# Patient Record
Sex: FEMALE | Race: WHITE | Employment: FULL TIME | URBAN - METROPOLITAN AREA
[De-identification: names, ages, dates, MRNs, and addresses within clinical notes are randomized per-mention and may not be internally consistent; named-entity substitution may affect disease eponyms.]

---

## 2023-01-19 ENCOUNTER — MEDICAL CORRESPONDENCE (OUTPATIENT)
Dept: HEALTH INFORMATION MANAGEMENT | Facility: CLINIC | Age: 33
End: 2023-01-19
Payer: COMMERCIAL

## 2023-01-24 ENCOUNTER — TRANSFERRED RECORDS (OUTPATIENT)
Dept: HEALTH INFORMATION MANAGEMENT | Facility: CLINIC | Age: 33
End: 2023-01-24
Payer: COMMERCIAL

## 2023-01-25 ENCOUNTER — TRANSCRIBE ORDERS (OUTPATIENT)
Dept: OTHER | Age: 33
End: 2023-01-25

## 2023-01-25 DIAGNOSIS — K70.10 CHRONIC ALCOHOLIC HEPATITIS (H): Primary | ICD-10-CM

## 2023-02-01 NOTE — TELEPHONE ENCOUNTER
DIAGNOSIS: Chronic alcoholic hepatitis   Appt Date: 03.17.2023   NOTES STATUS DETAILS   OFFICE NOTE from referring provider     OFFICE NOTES from other specialists Care Everywhere 01.24.2023 Catalina Matthew, CNP  [facility]   DISCHARGE SUMMARY from hospital     MEDICATION LIST Care Everywhere    LIVER BIOSPY (IF APPLICABLE)      PATHOLOGY REPORTS      IMAGING     ENDOSCOPY (IF AVAILABLE)     COLONOSCOPY (IF AVAILABLE)     ULTRASOUND LIVER Care Everywhere 01.23.2023, 01.13.2023, 01.06.2023 US Paracentesis    12.19.2022, 12.04.2022  US Abd Limited   CT OF ABDOMEN Care Everywhere 07.16.2020 CT Abd Pelvis   MRI OF LIVER     FIBROSCAN, US ELASTOGRAPHY, FIBROSIS SCAN, MR ELASTOGRAPHY     LABS     HEPATIC PANEL (LIVER PANEL) Care Everywhere 12.23.2022   BASIC METABOLIC PANEL Care Everywhere 01.31.2023   COMPLETE METABOLIC PANEL Care Everywhere 11.30.2022   COMPLETE BLOOD COUNT (CBC) Care Everywhere 11.30.2022   INTERNATIONAL NORMALIZED RATIO (INR) Care Everywhere 01.31.2023   HEPATITIS C ANTIBODY Care Everywhere 11.30.2022   HEPATITIS C VIRAL LOAD/PCR     HEPATITIS C GENOTYPE     HEPATITIS B SURFACE ANTIGEN Care Everywhere 11.30.2022   HEPATITIS B SURFACE ANTIBODY Care Everywhere 07.17.2020   HEPATITIS B DNA QUANT LEVEL     HEPATITIS B CORE ANTIBODY Care Everywhere 11.30.2022     Action 02.01.2023 RM 11:54A   Action Taken Sent fax to [facility] for images pending     Action 02.24.2023 RM   Action Taken Images received and uploaded to chart        07-Aug-2018

## 2023-03-17 ENCOUNTER — PRE VISIT (OUTPATIENT)
Dept: GASTROENTEROLOGY | Facility: CLINIC | Age: 33
End: 2023-03-17
Payer: COMMERCIAL